# Patient Record
Sex: FEMALE | Race: WHITE | NOT HISPANIC OR LATINO | Employment: FULL TIME | URBAN - METROPOLITAN AREA
[De-identification: names, ages, dates, MRNs, and addresses within clinical notes are randomized per-mention and may not be internally consistent; named-entity substitution may affect disease eponyms.]

---

## 2023-03-31 DIAGNOSIS — R91.1 PULMONARY NODULE: ICD-10-CM

## 2023-04-11 ENCOUNTER — TELEPHONE (OUTPATIENT)
Dept: PRIMARY CARE | Facility: CLINIC | Age: 58
End: 2023-04-11
Payer: COMMERCIAL

## 2023-04-11 NOTE — TELEPHONE ENCOUNTER
She is in a clinical study now for her potassium and he noticed that it is not being absorbed properly and it does stated not to break or crush but she is on a half a pill a day so she said she will try taking one a day now    Or maybe the manufacture changed?    What are your thoughts on this should she try one a day now?    Please advise

## 2023-04-13 NOTE — TELEPHONE ENCOUNTER
Per dr newell she can take potassium eod or she can still cut it in half    Advised pt and she is aware and she is due for more labs next Friday     And we will see where her potassium is once she gets it back.

## 2023-05-04 DIAGNOSIS — E87.6 LOW BLOOD POTASSIUM: ICD-10-CM

## 2023-05-04 RX ORDER — POTASSIUM CHLORIDE 750 MG/1
10 TABLET, EXTENDED RELEASE ORAL 2 TIMES DAILY
COMMUNITY
Start: 2018-05-14 | End: 2023-11-03 | Stop reason: ALTCHOICE

## 2023-05-04 RX ORDER — POTASSIUM CHLORIDE 1500 MG/1
20 TABLET, EXTENDED RELEASE ORAL 2 TIMES DAILY
Qty: 180 TABLET | Refills: 1 | Status: SHIPPED | OUTPATIENT
Start: 2023-05-04 | End: 2023-05-04

## 2023-05-30 DIAGNOSIS — E78.2 HYPERLIPIDEMIA, MIXED: ICD-10-CM

## 2023-05-30 RX ORDER — ATORVASTATIN CALCIUM 40 MG/1
40 TABLET, FILM COATED ORAL NIGHTLY
Qty: 90 TABLET | Refills: 3 | Status: SHIPPED | OUTPATIENT
Start: 2023-05-30 | End: 2023-05-30

## 2023-05-30 RX ORDER — ATORVASTATIN CALCIUM 40 MG/1
1 TABLET, FILM COATED ORAL NIGHTLY
COMMUNITY
Start: 2022-04-29 | End: 2023-05-30 | Stop reason: SDUPTHER

## 2023-09-21 DIAGNOSIS — I10 BENIGN ESSENTIAL HYPERTENSION: ICD-10-CM

## 2023-09-21 DIAGNOSIS — E03.9 HYPOTHYROIDISM (ACQUIRED): ICD-10-CM

## 2023-09-21 RX ORDER — AMLODIPINE AND BENAZEPRIL HYDROCHLORIDE 10; 40 MG/1; MG/1
1 CAPSULE ORAL DAILY
COMMUNITY
Start: 2014-12-12 | End: 2023-09-21 | Stop reason: SDUPTHER

## 2023-09-21 RX ORDER — LEVOTHYROXINE SODIUM 25 UG/1
25 TABLET ORAL
COMMUNITY
Start: 2018-05-14 | End: 2023-09-21 | Stop reason: SDUPTHER

## 2023-09-21 RX ORDER — LEVOTHYROXINE SODIUM 25 UG/1
25 TABLET ORAL
Qty: 90 TABLET | Refills: 3 | Status: SHIPPED | OUTPATIENT
Start: 2023-09-21 | End: 2023-09-21

## 2023-09-21 RX ORDER — AMLODIPINE AND BENAZEPRIL HYDROCHLORIDE 10; 40 MG/1; MG/1
1 CAPSULE ORAL DAILY
Qty: 90 CAPSULE | Refills: 3 | Status: SHIPPED | OUTPATIENT
Start: 2023-09-21 | End: 2023-09-21

## 2023-10-26 DIAGNOSIS — K21.9 GASTROESOPHAGEAL REFLUX DISEASE WITHOUT ESOPHAGITIS: ICD-10-CM

## 2023-10-26 RX ORDER — ESOMEPRAZOLE MAGNESIUM 40 MG/1
40 CAPSULE, DELAYED RELEASE ORAL 2 TIMES DAILY
COMMUNITY
Start: 2014-12-12 | End: 2023-10-26 | Stop reason: SDUPTHER

## 2023-10-26 RX ORDER — ESOMEPRAZOLE MAGNESIUM 40 MG/1
40 CAPSULE, DELAYED RELEASE ORAL 2 TIMES DAILY
Qty: 180 CAPSULE | Refills: 3 | Status: SHIPPED | OUTPATIENT
Start: 2023-10-26 | End: 2024-05-02 | Stop reason: SDUPTHER

## 2023-10-27 ENCOUNTER — PHARMACY VISIT (OUTPATIENT)
Dept: PHARMACY | Facility: CLINIC | Age: 58
End: 2023-10-27
Payer: COMMERCIAL

## 2023-10-27 PROCEDURE — RXMED WILLOW AMBULATORY MEDICATION CHARGE

## 2023-11-03 ENCOUNTER — OFFICE VISIT (OUTPATIENT)
Dept: PRIMARY CARE | Facility: CLINIC | Age: 58
End: 2023-11-03
Payer: COMMERCIAL

## 2023-11-03 ENCOUNTER — PHARMACY VISIT (OUTPATIENT)
Dept: PHARMACY | Facility: CLINIC | Age: 58
End: 2023-11-03
Payer: COMMERCIAL

## 2023-11-03 ENCOUNTER — HOSPITAL ENCOUNTER (OUTPATIENT)
Dept: RADIOLOGY | Facility: EXTERNAL LOCATION | Age: 58
Discharge: HOME | End: 2023-11-03

## 2023-11-03 VITALS
SYSTOLIC BLOOD PRESSURE: 129 MMHG | BODY MASS INDEX: 23.56 KG/M2 | OXYGEN SATURATION: 98 % | HEART RATE: 90 BPM | DIASTOLIC BLOOD PRESSURE: 80 MMHG | WEIGHT: 138 LBS | TEMPERATURE: 97.7 F | HEIGHT: 64 IN

## 2023-11-03 DIAGNOSIS — E03.9 HYPOTHYROIDISM (ACQUIRED): ICD-10-CM

## 2023-11-03 DIAGNOSIS — Z12.31 ENCOUNTER FOR SCREENING MAMMOGRAM FOR MALIGNANT NEOPLASM OF BREAST: ICD-10-CM

## 2023-11-03 DIAGNOSIS — I10 BENIGN ESSENTIAL HYPERTENSION: ICD-10-CM

## 2023-11-03 DIAGNOSIS — Z00.01 ANNUAL VISIT FOR GENERAL ADULT MEDICAL EXAMINATION WITH ABNORMAL FINDINGS: Primary | ICD-10-CM

## 2023-11-03 DIAGNOSIS — E78.2 HYPERLIPIDEMIA, MIXED: ICD-10-CM

## 2023-11-03 DIAGNOSIS — K21.00 GASTROESOPHAGEAL REFLUX DISEASE WITH ESOPHAGITIS WITHOUT HEMORRHAGE: ICD-10-CM

## 2023-11-03 DIAGNOSIS — Z00.00 HEALTH CARE MAINTENANCE: ICD-10-CM

## 2023-11-03 PROCEDURE — 1036F TOBACCO NON-USER: CPT | Performed by: INTERNAL MEDICINE

## 2023-11-03 PROCEDURE — 99396 PREV VISIT EST AGE 40-64: CPT | Performed by: INTERNAL MEDICINE

## 2023-11-03 PROCEDURE — 3079F DIAST BP 80-89 MM HG: CPT | Performed by: INTERNAL MEDICINE

## 2023-11-03 PROCEDURE — 3074F SYST BP LT 130 MM HG: CPT | Performed by: INTERNAL MEDICINE

## 2023-11-03 PROCEDURE — RXMED WILLOW AMBULATORY MEDICATION CHARGE

## 2023-11-03 RX ORDER — AMLODIPINE AND BENAZEPRIL HYDROCHLORIDE 10; 40 MG/1; MG/1
1 CAPSULE ORAL DAILY
Qty: 90 CAPSULE | Refills: 3 | Status: SHIPPED | OUTPATIENT
Start: 2023-11-03 | End: 2024-11-02

## 2023-11-03 RX ORDER — SCOLOPAMINE TRANSDERMAL SYSTEM 1 MG/1
1 PATCH, EXTENDED RELEASE TRANSDERMAL
COMMUNITY
Start: 2019-12-05 | End: 2023-11-03 | Stop reason: SDUPTHER

## 2023-11-03 RX ORDER — SCOLOPAMINE TRANSDERMAL SYSTEM 1 MG/1
1 PATCH, EXTENDED RELEASE TRANSDERMAL
Qty: 10 PATCH | Refills: 0 | Status: SHIPPED | OUTPATIENT
Start: 2023-11-03 | End: 2024-05-02 | Stop reason: WASHOUT

## 2023-11-03 RX ORDER — ATORVASTATIN CALCIUM 40 MG/1
40 TABLET, FILM COATED ORAL NIGHTLY
Qty: 90 TABLET | Refills: 3 | Status: SHIPPED | OUTPATIENT
Start: 2023-11-03 | End: 2024-11-02

## 2023-11-03 ASSESSMENT — PATIENT HEALTH QUESTIONNAIRE - PHQ9
1. LITTLE INTEREST OR PLEASURE IN DOING THINGS: NOT AT ALL
2. FEELING DOWN, DEPRESSED OR HOPELESS: NOT AT ALL
SUM OF ALL RESPONSES TO PHQ9 QUESTIONS 1 AND 2: 0

## 2023-11-03 NOTE — PROGRESS NOTES
Subjective   Patient ID: Jordyn Catalan is a 58 y.o. female who presents for Annual Exam.    Assessment/Plan     Problem List Items Addressed This Visit       Hyperlipidemia, mixed    Relevant Medications    atorvastatin (Lipitor) 40 mg tablet    Other Relevant Orders    Albumin , Urine Random    CBC and Auto Differential    Comprehensive Metabolic Panel    Hemoglobin A1C    Lipid Panel    Magnesium    TSH with reflex to Free T4 if abnormal    Uric Acid    Microscopic Only, Urine    Benign essential hypertension     Patients BP readings reviewed and addressed, as we age our arteries turn stiffer and less elastic. Restricting salt consumption and staying physically fit with regular exercise regimen is the only way to keep our vasculature less tonic. Studies have shown that keeping ideal body wt, exercise routine about 140 to 150 minutes a week, eating variety of plant based diet and drinking plentiful water are quite helpful. Monitor BP twice or once a week at home and bring log to be reviewed by me. Uncontrolled BP has long term consequences including heart failure, myocardial infarction, accelerated atherosclerosis and kidney dysfunction. Therapy reviewed and explained.           Relevant Medications    amLODIPine-benazepriL (Lotrel) 10-40 mg capsule    Other Relevant Orders    Albumin , Urine Random    CBC and Auto Differential    Comprehensive Metabolic Panel    Hemoglobin A1C    Lipid Panel    Magnesium    TSH with reflex to Free T4 if abnormal    Uric Acid    Microscopic Only, Urine    Hypothyroidism (acquired)    GERD (gastroesophageal reflux disease)    Annual visit for general adult medical examination with abnormal findings - Primary    Relevant Medications    scopolamine (Transderm-Scop) 1 mg over 3 days patch 3 day    Other Relevant Orders    Albumin , Urine Random    CBC and Auto Differential    Comprehensive Metabolic Panel    Hemoglobin A1C    Lipid Panel    Magnesium    TSH with reflex to Free T4  if abnormal    Uric Acid    Microscopic Only, Urine       HPI  This is a 58-year-old patient  with children personal history of hypertension hyperlipidemia hypothyroidism gastritis    Negative for headache chest pain hematuria rectal bleeding    Recently participant clinical trial for the losing weight getting the once a week injection subcu    Negative for rectal bleeding hematuria vaginal bleeding    Negative for fall    Negative for depression    Negative for COVID      Past Medical History:   Diagnosis Date    Encounter for screening for malignant neoplasm of colon 2020    Screen for colon cancer    Encounter for screening for malignant neoplasm of rectum 2020    Screening for rectal cancer    Encounter for screening mammogram for malignant neoplasm of breast 2020    Other screening mammogram    Obesity, unspecified 2022    Obesity (BMI 30.0-34.9)    Personal history of other diseases of the respiratory system 2018    History of influenza    Personal history of other endocrine, nutritional and metabolic disease 2018    History of hypokalemia    Personal history of other endocrine, nutritional and metabolic disease 2020    History of vitamin D deficiency    Personal history of other specified conditions     History of fever    Personal history of urinary (tract) infections 2018    History of urinary tract infection     Past Surgical History:   Procedure Laterality Date    BREAST BIOPSY       SECTION, LOW TRANSVERSE      GALLBLADDER SURGERY       Allergies   Allergen Reactions    Apricot Flavor Hives     Current Outpatient Medications   Medication Sig Dispense Refill    esomeprazole (NexIUM) 40 mg DR capsule Take 1 capsule (40 mg) by mouth 2 times a day. 180 capsule 3    levothyroxine (Synthroid, Levoxyl) 25 mcg tablet TAKE 1 TABLET BY MOUTH EVERY MORNING BEFORE MEALS 90 tablet 3    potassium chloride CR (Klor-Con M20) 20 mEq ER tablet TAKE 1 TABLET BY  MOUTH TWO TIMES A DAY (DO NOT CRUSH,CHEW OR SPLIT) 180 tablet 1    amLODIPine-benazepriL (Lotrel) 10-40 mg capsule TAKE 1 CAPSULE BY MOUTH ONCE DAILY 90 capsule 3    atorvastatin (Lipitor) 40 mg tablet TAKE 1 TABLET BY MOUTH AT BEDTIME 90 tablet 3    scopolamine (Transderm-Scop) 1 mg over 3 days patch 3 day Place 1 patch on the skin every 3rd day. 10 patch 0     No current facility-administered medications for this visit.     Family History   Problem Relation Name Age of Onset    Dementia Mother      Lymphoma Father       Social History     Socioeconomic History    Marital status:      Spouse name: None    Number of children: None    Years of education: None    Highest education level: None   Occupational History    None   Tobacco Use    Smoking status: Never    Smokeless tobacco: Never   Substance and Sexual Activity    Alcohol use: Never    Drug use: Never    Sexual activity: None   Other Topics Concern    None   Social History Narrative    None     Social Determinants of Health     Financial Resource Strain: Not on file   Food Insecurity: Not on file   Transportation Needs: Not on file   Physical Activity: Not on file   Stress: Not on file   Social Connections: Not on file   Intimate Partner Violence: Not on file   Housing Stability: Not on file     Immunization History   Administered Date(s) Administered    Flu vaccine (IIV4), preservative free *Check age/dose* 10/15/2023    Influenza, seasonal, injectable 09/04/2020    Moderna COVID-19 vaccine, Fall 2023, 12 yeasrs and older (50mcg/0.5mL) 10/15/2023    Moderna SARS-CoV-2 Vaccination 01/18/2021, 02/15/2021, 11/11/2021, 05/17/2022    Pneumococcal Conjugate PCV 7 1963    Pneumococcal polysaccharide vaccine, 23-valent, age 2 years and older (PNEUMOVAX 23) 07/12/2016    Tdap vaccine, age 7 year and older (BOOSTRIX) 07/12/2016, 05/30/2019    Zoster vaccine, recombinant, adult (SHINGRIX) 07/19/2019, 06/19/2020, 09/04/2020       Review of Systems  Review  "of systems is otherwise negative unless stated above or in history of present illness.    Objective   Visit Vitals  /80 (BP Location: Left arm, Patient Position: Sitting, BP Cuff Size: Adult)   Pulse 90   Temp 36.5 °C (97.7 °F)   Ht 1.626 m (5' 4\")   Wt 62.6 kg (138 lb)   SpO2 98%   BMI 23.69 kg/m²   Smoking Status Never   BSA 1.68 m²     Physical Exam  Constitutional:       General: not in acute distress.   HENT:      Head: Normocephalic and atraumatic.      Nose: Nose normal.   Eyes:      Extraocular Movements: Extraocular movements intact.      Conjunctiva/sclera: Conjunctivae normal.   Cardiovascular:      Rate and Rhythm: Normal rate ,  No M/R/G  Pulmonary:      Effort: Pulmonary effort is normal.      Breath sounds: Normal, Bilat Equal AE  Skin:     General: Skin is warm.   Neurological:      Mental Status: He is alert and oriented to person, place, and time.   Psychiatric:         Mood and Affect: Mood normal.         Behavior: Behavior normal.   Musculoskeletal   FROM in all extremitirs,  Joint-no swelling or tenderness  Hyperlipidemia increase Lipitor to 80 mg a day  Hemoglobin A1c advised metformin 500 mg a day electrolyte imbalance check potassium magnesium calcium mild UTI check UA CNS  No visits with results within 4 Month(s) from this visit.   Latest known visit with results is:   Legacy Encounter on 08/20/2022   Component Date Value Ref Range Status    Cholesterol 08/20/2022 209 (H)  0 - 199 mg/dL Final    HDL 08/20/2022 61.3  mg/dL Final    Cholesterol/HDL Ratio 08/20/2022 3.4   Final    LDL 08/20/2022 126 (H)  0 - 99 mg/dL Final    VLDL 08/20/2022 22  0 - 40 mg/dL Final    Triglycerides 08/20/2022 109  0 - 149 mg/dL Final    TSH 08/20/2022 3.59  0.44 - 3.98 mIU/L Final    WBC 08/20/2022 6.3  4.4 - 11.3 x10E9/L Final    nRBC 08/20/2022 0.0  0.0 - 0.0 /100 WBC Final    RBC 08/20/2022 4.72  4.00 - 5.20 x10E12/L Final    Hemoglobin 08/20/2022 14.0  12.0 - 16.0 g/dL Final    Hematocrit 08/20/2022 " 42.3  36.0 - 46.0 % Final    MCV 08/20/2022 90  80 - 100 fL Final    MCHC 08/20/2022 33.1  32.0 - 36.0 g/dL Final    Platelets 08/20/2022 343  150 - 450 x10E9/L Final    RDW 08/20/2022 12.5  11.5 - 14.5 % Final    Neutrophils % 08/20/2022 59.2  40.0 - 80.0 % Final    Immature Granulocytes %, Automated 08/20/2022 0.3  0.0 - 0.9 % Final    Lymphocytes % 08/20/2022 28.0  13.0 - 44.0 % Final    Monocytes % 08/20/2022 8.2  2.0 - 10.0 % Final    Eosinophils % 08/20/2022 3.5  0.0 - 6.0 % Final    Basophils % 08/20/2022 0.8  0.0 - 2.0 % Final    Neutrophils Absolute 08/20/2022 3.70  1.20 - 7.70 x10E9/L Final    Lymphocytes Absolute 08/20/2022 1.75  1.20 - 4.80 x10E9/L Final    Monocytes Absolute 08/20/2022 0.51  0.10 - 1.00 x10E9/L Final    Eosinophils Absolute 08/20/2022 0.22  0.00 - 0.70 x10E9/L Final    Basophils Absolute 08/20/2022 0.05  0.00 - 0.10 x10E9/L Final    WBC, Urine 08/20/2022 6 (A)  0 - 5 /HPF Final    RBC, Urine 08/20/2022 2  0 - 5 /HPF Final    Squamous Epithelial Cells, Urine 08/20/2022 <1  /HPF Final    Hemoglobin A1C 08/20/2022 5.7 (A)  % Final    Estimated Average Glucose 08/20/2022 117  MG/DL Final    Glucose 08/20/2022 105 (H)  74 - 99 mg/dL Final    Sodium 08/20/2022 142  136 - 145 mmol/L Final    Potassium 08/20/2022 3.4 (L)  3.5 - 5.3 mmol/L Final    Chloride 08/20/2022 104  98 - 107 mmol/L Final    Bicarbonate 08/20/2022 30  21 - 32 mmol/L Final    Anion Gap 08/20/2022 11  10 - 20 mmol/L Final    Urea Nitrogen 08/20/2022 17  6 - 23 mg/dL Final    Creatinine 08/20/2022 0.77  0.50 - 1.05 mg/dL Final    GFR Female 08/20/2022 89  >90 mL/min/1.73m2 Final    Calcium 08/20/2022 9.5  8.6 - 10.6 mg/dL Final    Albumin 08/20/2022 4.1  3.4 - 5.0 g/dL Final    Alkaline Phosphatase 08/20/2022 62  33 - 110 U/L Final    Total Protein 08/20/2022 6.4  6.4 - 8.2 g/dL Final    AST 08/20/2022 14  9 - 39 U/L Final    Total Bilirubin 08/20/2022 0.5  0.0 - 1.2 mg/dL Final    ALT (SGPT) 08/20/2022 16  7 - 45 U/L Final     Color, Urine 08/20/2022 YELLOW  STRAW,YELLOW Final    Appearance, Urine 08/20/2022 CLEAR  CLEAR Final    Specific Gravity, Urine 08/20/2022 1.017  1.005 - 1.035 Final    pH, Urine 08/20/2022 5.0  5.0 - 8.0 Final    Protein, Urine 08/20/2022 NEGATIVE  NEGATIVE mg/dL Final    Glucose, Urine 08/20/2022 NEGATIVE  NEGATIVE mg/dL Final    Blood, Urine 08/20/2022 NEGATIVE  NEGATIVE Final    Ketones, Urine 08/20/2022 NEGATIVE  NEGATIVE mg/dL Final    Bilirubin, Urine 08/20/2022 NEGATIVE  NEGATIVE Final    Urobilinogen, Urine 08/20/2022 <2.0  0.0 - 1.9 mg/dL Final    Nitrite, Urine 08/20/2022 NEGATIVE  NEGATIVE Final    Leukocyte Esterase, Urine 08/20/2022 TRACE (A)  NEGATIVE Final       Radiology: Reviewed imaging in powerchart.  BI mammo bilateral screening tomosynthesis    Result Date: 11/3/2023  These images are not reportable by radiology and will not be interpreted by  Radiologists.        Charting was completed using voice recognition technology and may include unintended errors.

## 2023-12-14 PROCEDURE — RXMED WILLOW AMBULATORY MEDICATION CHARGE

## 2023-12-19 ENCOUNTER — PHARMACY VISIT (OUTPATIENT)
Dept: PHARMACY | Facility: CLINIC | Age: 58
End: 2023-12-19
Payer: COMMERCIAL

## 2024-01-08 PROCEDURE — RXMED WILLOW AMBULATORY MEDICATION CHARGE

## 2024-01-10 ENCOUNTER — PHARMACY VISIT (OUTPATIENT)
Dept: PHARMACY | Facility: CLINIC | Age: 59
End: 2024-01-10
Payer: COMMERCIAL

## 2024-01-10 PROCEDURE — RXMED WILLOW AMBULATORY MEDICATION CHARGE

## 2024-01-11 ENCOUNTER — PHARMACY VISIT (OUTPATIENT)
Dept: PHARMACY | Facility: CLINIC | Age: 59
End: 2024-01-11
Payer: COMMERCIAL

## 2024-02-09 ENCOUNTER — TELEPHONE (OUTPATIENT)
Dept: PRIMARY CARE | Facility: CLINIC | Age: 59
End: 2024-02-09
Payer: COMMERCIAL

## 2024-02-09 DIAGNOSIS — E87.6 LOW BLOOD POTASSIUM: ICD-10-CM

## 2024-02-09 PROCEDURE — RXMED WILLOW AMBULATORY MEDICATION CHARGE

## 2024-02-09 RX ORDER — POTASSIUM CHLORIDE 20 MEQ/1
TABLET, EXTENDED RELEASE ORAL
Qty: 180 TABLET | Refills: 1 | Status: SHIPPED | OUTPATIENT
Start: 2024-02-09 | End: 2024-05-02 | Stop reason: WASHOUT

## 2024-02-09 NOTE — TELEPHONE ENCOUNTER
----- Message from Jordyn Catalan sent at 2/9/2024 10:02 AM EST -----  Regarding: Refill  Contact: [phone]  Hi. I need my potassium refilled but I need to make sure I get the right one!  The label says [facility] if that helps. Potassium cl 20 mEq SR tab. Thanks much. This needs to go to [facility] and they send it to me.

## 2024-02-13 ENCOUNTER — PHARMACY VISIT (OUTPATIENT)
Dept: PHARMACY | Facility: CLINIC | Age: 59
End: 2024-02-13
Payer: COMMERCIAL

## 2024-02-22 DIAGNOSIS — N39.0 URINARY TRACT INFECTION WITHOUT HEMATURIA, SITE UNSPECIFIED: ICD-10-CM

## 2024-02-22 RX ORDER — CIPROFLOXACIN 250 MG/1
250 TABLET, FILM COATED ORAL 2 TIMES DAILY
Qty: 14 TABLET | Refills: 0 | Status: SHIPPED | OUTPATIENT
Start: 2024-02-22 | End: 2024-03-04

## 2024-02-23 PROCEDURE — RXMED WILLOW AMBULATORY MEDICATION CHARGE

## 2024-02-26 ENCOUNTER — PHARMACY VISIT (OUTPATIENT)
Dept: PHARMACY | Facility: CLINIC | Age: 59
End: 2024-02-26
Payer: COMMERCIAL

## 2024-03-12 PROCEDURE — RXMED WILLOW AMBULATORY MEDICATION CHARGE

## 2024-03-16 ENCOUNTER — PHARMACY VISIT (OUTPATIENT)
Dept: PHARMACY | Facility: CLINIC | Age: 59
End: 2024-03-16
Payer: COMMERCIAL

## 2024-03-22 PROCEDURE — RXMED WILLOW AMBULATORY MEDICATION CHARGE

## 2024-03-25 ENCOUNTER — PHARMACY VISIT (OUTPATIENT)
Dept: PHARMACY | Facility: CLINIC | Age: 59
End: 2024-03-25
Payer: COMMERCIAL

## 2024-04-16 PROCEDURE — RXMED WILLOW AMBULATORY MEDICATION CHARGE

## 2024-04-25 ENCOUNTER — PHARMACY VISIT (OUTPATIENT)
Dept: PHARMACY | Facility: CLINIC | Age: 59
End: 2024-04-25
Payer: COMMERCIAL

## 2024-04-25 DIAGNOSIS — K21.9 GASTROESOPHAGEAL REFLUX DISEASE WITHOUT ESOPHAGITIS: ICD-10-CM

## 2024-04-25 PROCEDURE — RXMED WILLOW AMBULATORY MEDICATION CHARGE

## 2024-04-25 RX ORDER — PANTOPRAZOLE SODIUM 40 MG/1
40 TABLET, DELAYED RELEASE ORAL DAILY
Qty: 30 TABLET | Refills: 1 | Status: SHIPPED | OUTPATIENT
Start: 2024-04-25 | End: 2024-05-02 | Stop reason: ALTCHOICE

## 2024-04-26 ENCOUNTER — PHARMACY VISIT (OUTPATIENT)
Dept: PHARMACY | Facility: CLINIC | Age: 59
End: 2024-04-26
Payer: COMMERCIAL

## 2024-05-02 ENCOUNTER — TELEMEDICINE (OUTPATIENT)
Dept: PRIMARY CARE | Facility: CLINIC | Age: 59
End: 2024-05-02
Payer: COMMERCIAL

## 2024-05-02 VITALS — HEIGHT: 64 IN | WEIGHT: 130 LBS | BODY MASS INDEX: 22.2 KG/M2

## 2024-05-02 DIAGNOSIS — E03.9 HYPOTHYROIDISM (ACQUIRED): ICD-10-CM

## 2024-05-02 DIAGNOSIS — Z11.4 SCREENING FOR HIV WITHOUT PRESENCE OF RISK FACTORS: ICD-10-CM

## 2024-05-02 DIAGNOSIS — R10.10 PAIN OF UPPER ABDOMEN: ICD-10-CM

## 2024-05-02 DIAGNOSIS — K21.9 GASTROESOPHAGEAL REFLUX DISEASE WITHOUT ESOPHAGITIS: ICD-10-CM

## 2024-05-02 DIAGNOSIS — I10 BENIGN ESSENTIAL HYPERTENSION: ICD-10-CM

## 2024-05-02 DIAGNOSIS — Z53.20 SCREENING FOR HEPATITIS C DECLINED: ICD-10-CM

## 2024-05-02 DIAGNOSIS — K21.00 GASTROESOPHAGEAL REFLUX DISEASE WITH ESOPHAGITIS WITHOUT HEMORRHAGE: Primary | ICD-10-CM

## 2024-05-02 DIAGNOSIS — E78.2 HYPERLIPIDEMIA, MIXED: ICD-10-CM

## 2024-05-02 PROCEDURE — 99214 OFFICE O/P EST MOD 30 MIN: CPT | Performed by: INTERNAL MEDICINE

## 2024-05-02 PROCEDURE — 1036F TOBACCO NON-USER: CPT | Performed by: INTERNAL MEDICINE

## 2024-05-02 RX ORDER — ESOMEPRAZOLE MAGNESIUM 40 MG/1
40 CAPSULE, DELAYED RELEASE ORAL 2 TIMES DAILY
Qty: 180 CAPSULE | Refills: 3 | Status: SHIPPED | OUTPATIENT
Start: 2024-05-02

## 2024-05-02 NOTE — PROGRESS NOTES
Subjective   Patient ID: Jordyn Catalan is a 58 y.o. female who presents for Follow-up (Discuss Nexium ).    Assessment/Plan     Problem List Items Addressed This Visit       Hyperlipidemia, mixed    Relevant Orders    HIV 1/2 Antigen/Antibody Screen with Reflex to Confirmation    Hepatitis C antibody    CBC and Auto Differential    Comprehensive Metabolic Panel    Lipid Panel    TSH with reflex to Free T4 if abnormal    Microscopic Only, Urine    Lipase    Amylase    H. pylori antigen, stool    Sedimentation rate, automated    CT abdomen pelvis w and wo IV contrast    Benign essential hypertension     Patients BP readings reviewed and addressed, as we age our arteries turn stiffer and less elastic. Restricting salt consumption and staying physically fit with regular exercise regimen is the only way to keep our vasculature less tonic. Studies have shown that keeping ideal body wt, exercise routine about 140 to 150 minutes a week, eating variety of plant based diet and drinking plentiful water are quite helpful. Monitor BP twice or once a week at home and bring log to be reviewed by me. Uncontrolled BP has long term consequences including heart failure, myocardial infarction, accelerated atherosclerosis and kidney dysfunction. Therapy reviewed and explained.           Relevant Orders    HIV 1/2 Antigen/Antibody Screen with Reflex to Confirmation    Hepatitis C antibody    CBC and Auto Differential    Comprehensive Metabolic Panel    Lipid Panel    TSH with reflex to Free T4 if abnormal    Microscopic Only, Urine    Lipase    Amylase    H. pylori antigen, stool    Sedimentation rate, automated    CT abdomen pelvis w and wo IV contrast    Hypothyroidism (acquired)    GERD (gastroesophageal reflux disease) - Primary     Seen by gastroenterology underwent for EGD colonoscopy 2022 on weight loss program had a 30 pound weight loss with moderate to severe pain started with the Nexium and Protonix switch over because of  the insurance discussed with the patient advised gastroenterology evaluation with EGD CAT scan abdomen pelvis check CBC CMP amylase lipase sed rate H. pylori continue Nexium 40 twice a day if no better come to office go to ER         Screening for HIV without presence of risk factors    Relevant Orders    HIV 1/2 Antigen/Antibody Screen with Reflex to Confirmation    Hepatitis C antibody    CBC and Auto Differential    Comprehensive Metabolic Panel    Lipid Panel    TSH with reflex to Free T4 if abnormal    Microscopic Only, Urine    Lipase    Amylase    H. pylori antigen, stool    Sedimentation rate, automated    CT abdomen pelvis w and wo IV contrast    Screening for hepatitis C declined    Relevant Orders    HIV 1/2 Antigen/Antibody Screen with Reflex to Confirmation    Hepatitis C antibody    CBC and Auto Differential    Comprehensive Metabolic Panel    Lipid Panel    TSH with reflex to Free T4 if abnormal    Microscopic Only, Urine    Lipase    Amylase    H. pylori antigen, stool    Sedimentation rate, automated    CT abdomen pelvis w and wo IV contrast    Pain of upper abdomen    Relevant Orders    HIV 1/2 Antigen/Antibody Screen with Reflex to Confirmation    Hepatitis C antibody    CBC and Auto Differential    Comprehensive Metabolic Panel    Lipid Panel    TSH with reflex to Free T4 if abnormal    Microscopic Only, Urine    Lipase    Amylase    H. pylori antigen, stool    Sedimentation rate, automated    CT abdomen pelvis w and wo IV contrast    EGD       HPI 58-year-old patient  with 3 children grandchildren    Personal history of hypertension hyperlipidemia colon polyps chronic gastritis hiatal hernia    Mother have gastritis    Father have a hiatal hernia gastritis    Negative for: Esophageal cancer    Recently involved in the clinical trial for the weight loss taking the injection once a week    Successfully lost 30 pound weight    Recently insurance does not cover Nexium changed to the Protonix  had a moderate to severe pain in the epigastric and abdomen especially at night    Negative for jaundice hematuria rectal bleeding steatorrhea    Negative for pruritus or skin      Past Medical History:   Diagnosis Date    Encounter for screening for malignant neoplasm of colon 2020    Screen for colon cancer    Encounter for screening for malignant neoplasm of rectum 2020    Screening for rectal cancer    Encounter for screening mammogram for malignant neoplasm of breast 2020    Other screening mammogram    Obesity, unspecified 2022    Obesity (BMI 30.0-34.9)    Personal history of other diseases of the respiratory system 2018    History of influenza    Personal history of other endocrine, nutritional and metabolic disease 2018    History of hypokalemia    Personal history of other endocrine, nutritional and metabolic disease 2020    History of vitamin D deficiency    Personal history of other specified conditions     History of fever    Personal history of urinary (tract) infections 2018    History of urinary tract infection     Past Surgical History:   Procedure Laterality Date    BREAST BIOPSY       SECTION, LOW TRANSVERSE      GALLBLADDER SURGERY       Allergies   Allergen Reactions    Apricot Flavor Hives     Current Outpatient Medications   Medication Sig Dispense Refill    amLODIPine-benazepriL (Lotrel) 10-40 mg capsule TAKE 1 CAPSULE BY MOUTH ONCE DAILY 90 capsule 3    atorvastatin (Lipitor) 40 mg tablet TAKE 1 TABLET BY MOUTH AT BEDTIME 90 tablet 3    esomeprazole (NexIUM) 40 mg DR capsule Take 1 capsule (40 mg) by mouth 2 times a day. 180 capsule 3    levothyroxine (Synthroid, Levoxyl) 25 mcg tablet TAKE 1 TABLET BY MOUTH EVERY MORNING BEFORE MEALS 90 tablet 3     No current facility-administered medications for this visit.     Family History   Problem Relation Name Age of Onset    Dementia Mother      Lymphoma Father       Social History  "    Socioeconomic History    Marital status:      Spouse name: None    Number of children: None    Years of education: None    Highest education level: None   Occupational History    None   Tobacco Use    Smoking status: Never    Smokeless tobacco: Never   Substance and Sexual Activity    Alcohol use: Never    Drug use: Never    Sexual activity: None   Other Topics Concern    None   Social History Narrative    None     Social Determinants of Health     Financial Resource Strain: Not on file   Food Insecurity: Not on file   Transportation Needs: Not on file   Physical Activity: Not on file   Stress: Not on file   Social Connections: Not on file   Intimate Partner Violence: Not on file   Housing Stability: Not on file     Immunization History   Administered Date(s) Administered    Flu vaccine (IIV4), preservative free *Check age/dose* 10/15/2023    Influenza, seasonal, injectable 09/04/2020    Moderna COVID-19 vaccine, Fall 2023, 12 yeasrs and older (50mcg/0.5mL) 10/15/2023    Moderna SARS-CoV-2 Vaccination 01/18/2021, 02/15/2021, 11/11/2021, 05/17/2022    Pneumococcal Conjugate PCV 7 1963    Pneumococcal polysaccharide vaccine, 23-valent, age 2 years and older (PNEUMOVAX 23) 07/12/2016    Tdap vaccine, age 7 year and older (BOOSTRIX, ADACEL) 07/12/2016, 05/30/2019    Zoster vaccine, recombinant, adult (SHINGRIX) 07/19/2019, 06/19/2020, 09/04/2020       Review of Systems  Review of systems is otherwise negative unless stated above or in history of present illness.    Objective   Visit Vitals  Ht 1.626 m (5' 4\")   Wt 59 kg (130 lb)   BMI 22.31 kg/m²   Smoking Status Never   BSA 1.63 m²     Physical Exam  Patient looks sick but no jaundice    No visits with results within 4 Month(s) from this visit.   Latest known visit with results is:   Legacy Encounter on 08/20/2022   Component Date Value Ref Range Status    Cholesterol 08/20/2022 209 (H)  0 - 199 mg/dL Final    HDL 08/20/2022 61.3  mg/dL Final    " Cholesterol/HDL Ratio 08/20/2022 3.4   Final    LDL 08/20/2022 126 (H)  0 - 99 mg/dL Final    VLDL 08/20/2022 22  0 - 40 mg/dL Final    Triglycerides 08/20/2022 109  0 - 149 mg/dL Final    TSH 08/20/2022 3.59  0.44 - 3.98 mIU/L Final    WBC 08/20/2022 6.3  4.4 - 11.3 x10E9/L Final    nRBC 08/20/2022 0.0  0.0 - 0.0 /100 WBC Final    RBC 08/20/2022 4.72  4.00 - 5.20 x10E12/L Final    Hemoglobin 08/20/2022 14.0  12.0 - 16.0 g/dL Final    Hematocrit 08/20/2022 42.3  36.0 - 46.0 % Final    MCV 08/20/2022 90  80 - 100 fL Final    MCHC 08/20/2022 33.1  32.0 - 36.0 g/dL Final    Platelets 08/20/2022 343  150 - 450 x10E9/L Final    RDW 08/20/2022 12.5  11.5 - 14.5 % Final    Neutrophils % 08/20/2022 59.2  40.0 - 80.0 % Final    Immature Granulocytes %, Automated 08/20/2022 0.3  0.0 - 0.9 % Final    Lymphocytes % 08/20/2022 28.0  13.0 - 44.0 % Final    Monocytes % 08/20/2022 8.2  2.0 - 10.0 % Final    Eosinophils % 08/20/2022 3.5  0.0 - 6.0 % Final    Basophils % 08/20/2022 0.8  0.0 - 2.0 % Final    Neutrophils Absolute 08/20/2022 3.70  1.20 - 7.70 x10E9/L Final    Lymphocytes Absolute 08/20/2022 1.75  1.20 - 4.80 x10E9/L Final    Monocytes Absolute 08/20/2022 0.51  0.10 - 1.00 x10E9/L Final    Eosinophils Absolute 08/20/2022 0.22  0.00 - 0.70 x10E9/L Final    Basophils Absolute 08/20/2022 0.05  0.00 - 0.10 x10E9/L Final    WBC, Urine 08/20/2022 6 (A)  0 - 5 /HPF Final    RBC, Urine 08/20/2022 2  0 - 5 /HPF Final    Squamous Epithelial Cells, Urine 08/20/2022 <1  /HPF Final    Hemoglobin A1C 08/20/2022 5.7 (A)  % Final    Estimated Average Glucose 08/20/2022 117  MG/DL Final    Glucose 08/20/2022 105 (H)  74 - 99 mg/dL Final    Sodium 08/20/2022 142  136 - 145 mmol/L Final    Potassium 08/20/2022 3.4 (L)  3.5 - 5.3 mmol/L Final    Chloride 08/20/2022 104  98 - 107 mmol/L Final    Bicarbonate 08/20/2022 30  21 - 32 mmol/L Final    Anion Gap 08/20/2022 11  10 - 20 mmol/L Final    Urea Nitrogen 08/20/2022 17  6 - 23 mg/dL Final     Creatinine 08/20/2022 0.77  0.50 - 1.05 mg/dL Final    GFR Female 08/20/2022 89  >90 mL/min/1.73m2 Final    Calcium 08/20/2022 9.5  8.6 - 10.6 mg/dL Final    Albumin 08/20/2022 4.1  3.4 - 5.0 g/dL Final    Alkaline Phosphatase 08/20/2022 62  33 - 110 U/L Final    Total Protein 08/20/2022 6.4  6.4 - 8.2 g/dL Final    AST 08/20/2022 14  9 - 39 U/L Final    Total Bilirubin 08/20/2022 0.5  0.0 - 1.2 mg/dL Final    ALT (SGPT) 08/20/2022 16  7 - 45 U/L Final    Color, Urine 08/20/2022 YELLOW  STRAW,YELLOW Final    Appearance, Urine 08/20/2022 CLEAR  CLEAR Final    Specific Gravity, Urine 08/20/2022 1.017  1.005 - 1.035 Final    pH, Urine 08/20/2022 5.0  5.0 - 8.0 Final    Protein, Urine 08/20/2022 NEGATIVE  NEGATIVE mg/dL Final    Glucose, Urine 08/20/2022 NEGATIVE  NEGATIVE mg/dL Final    Blood, Urine 08/20/2022 NEGATIVE  NEGATIVE Final    Ketones, Urine 08/20/2022 NEGATIVE  NEGATIVE mg/dL Final    Bilirubin, Urine 08/20/2022 NEGATIVE  NEGATIVE Final    Urobilinogen, Urine 08/20/2022 <2.0  0.0 - 1.9 mg/dL Final    Nitrite, Urine 08/20/2022 NEGATIVE  NEGATIVE Final    Leukocyte Esterase, Urine 08/20/2022 TRACE (A)  NEGATIVE Final       Radiology: Reviewed imaging in powerchart.  No results found.      Charting was completed using voice recognition technology and may include unintended errors.

## 2024-05-02 NOTE — ASSESSMENT & PLAN NOTE
Seen by gastroenterology underwent for EGD colonoscopy 2022 on weight loss program had a 30 pound weight loss with moderate to severe pain started with the Nexium and Protonix switch over because of the insurance discussed with the patient advised gastroenterology evaluation with EGD CAT scan abdomen pelvis check CBC CMP amylase lipase sed rate H. pylori continue Nexium 40 twice a day if no better come to office go to ER

## 2024-05-07 PROCEDURE — RXMED WILLOW AMBULATORY MEDICATION CHARGE

## 2024-05-10 ENCOUNTER — PHARMACY VISIT (OUTPATIENT)
Dept: PHARMACY | Facility: CLINIC | Age: 59
End: 2024-05-10
Payer: COMMERCIAL

## 2024-05-17 ENCOUNTER — APPOINTMENT (OUTPATIENT)
Dept: PRIMARY CARE | Facility: CLINIC | Age: 59
End: 2024-05-17
Payer: COMMERCIAL

## 2024-05-20 ENCOUNTER — APPOINTMENT (OUTPATIENT)
Dept: RADIOLOGY | Facility: CLINIC | Age: 59
End: 2024-05-20
Payer: COMMERCIAL

## 2024-05-28 DIAGNOSIS — E87.6 LOW BLOOD POTASSIUM: ICD-10-CM

## 2024-05-28 PROCEDURE — RXMED WILLOW AMBULATORY MEDICATION CHARGE

## 2024-05-28 RX ORDER — POTASSIUM CHLORIDE 20 MEQ/1
20 TABLET, EXTENDED RELEASE ORAL DAILY
Qty: 90 TABLET | Refills: 3 | Status: SHIPPED | OUTPATIENT
Start: 2024-05-28 | End: 2025-05-28

## 2024-06-04 ENCOUNTER — PHARMACY VISIT (OUTPATIENT)
Dept: PHARMACY | Facility: CLINIC | Age: 59
End: 2024-06-04
Payer: COMMERCIAL

## 2024-06-10 PROCEDURE — RXMED WILLOW AMBULATORY MEDICATION CHARGE

## 2024-06-12 ENCOUNTER — PHARMACY VISIT (OUTPATIENT)
Dept: PHARMACY | Facility: CLINIC | Age: 59
End: 2024-06-12
Payer: COMMERCIAL

## 2024-07-12 PROCEDURE — RXMED WILLOW AMBULATORY MEDICATION CHARGE

## 2024-07-15 ENCOUNTER — PHARMACY VISIT (OUTPATIENT)
Dept: PHARMACY | Facility: CLINIC | Age: 59
End: 2024-07-15
Payer: COMMERCIAL

## 2024-08-01 PROCEDURE — RXMED WILLOW AMBULATORY MEDICATION CHARGE

## 2024-08-03 ENCOUNTER — PHARMACY VISIT (OUTPATIENT)
Dept: PHARMACY | Facility: CLINIC | Age: 59
End: 2024-08-03
Payer: COMMERCIAL

## 2024-08-26 DIAGNOSIS — E03.9 HYPOTHYROIDISM (ACQUIRED): ICD-10-CM

## 2024-08-26 PROCEDURE — RXMED WILLOW AMBULATORY MEDICATION CHARGE

## 2024-08-26 RX ORDER — LEVOTHYROXINE SODIUM 25 UG/1
25 TABLET ORAL
Qty: 90 TABLET | Refills: 3 | Status: SHIPPED | OUTPATIENT
Start: 2024-08-26 | End: 2025-08-26

## 2024-08-27 ENCOUNTER — PHARMACY VISIT (OUTPATIENT)
Dept: PHARMACY | Facility: CLINIC | Age: 59
End: 2024-08-27
Payer: COMMERCIAL

## 2024-09-18 PROCEDURE — RXMED WILLOW AMBULATORY MEDICATION CHARGE

## 2024-09-20 ENCOUNTER — PHARMACY VISIT (OUTPATIENT)
Dept: PHARMACY | Facility: CLINIC | Age: 59
End: 2024-09-20
Payer: COMMERCIAL

## 2024-09-30 DIAGNOSIS — Z12.31 ENCOUNTER FOR SCREENING MAMMOGRAM FOR MALIGNANT NEOPLASM OF BREAST: ICD-10-CM

## 2024-10-24 PROCEDURE — RXMED WILLOW AMBULATORY MEDICATION CHARGE

## 2024-10-26 ENCOUNTER — PHARMACY VISIT (OUTPATIENT)
Dept: PHARMACY | Facility: CLINIC | Age: 59
End: 2024-10-26
Payer: COMMERCIAL

## 2024-11-12 ENCOUNTER — TELEPHONE (OUTPATIENT)
Dept: PRIMARY CARE | Facility: CLINIC | Age: 59
End: 2024-11-12
Payer: COMMERCIAL

## 2024-11-12 DIAGNOSIS — E78.2 HYPERLIPIDEMIA, MIXED: ICD-10-CM

## 2024-11-12 PROCEDURE — RXMED WILLOW AMBULATORY MEDICATION CHARGE

## 2024-11-12 RX ORDER — ATORVASTATIN CALCIUM 40 MG/1
40 TABLET, FILM COATED ORAL NIGHTLY
Qty: 90 TABLET | Refills: 3 | Status: SHIPPED | OUTPATIENT
Start: 2024-11-12 | End: 2025-11-12

## 2024-11-12 NOTE — TELEPHONE ENCOUNTER
PT had a physical scheduled for 11/15 and we moved it to 1/27/25 per PT's request needing a morning appointment.      PT needs a refill on her atorvastatin (Lipitor) 40 mg tablet

## 2024-11-15 ENCOUNTER — APPOINTMENT (OUTPATIENT)
Dept: PRIMARY CARE | Facility: CLINIC | Age: 59
End: 2024-11-15
Payer: COMMERCIAL

## 2024-11-16 ENCOUNTER — PHARMACY VISIT (OUTPATIENT)
Dept: PHARMACY | Facility: CLINIC | Age: 59
End: 2024-11-16
Payer: COMMERCIAL

## 2024-12-04 DIAGNOSIS — I10 BENIGN ESSENTIAL HYPERTENSION: ICD-10-CM

## 2024-12-05 PROCEDURE — RXMED WILLOW AMBULATORY MEDICATION CHARGE

## 2024-12-05 RX ORDER — AMLODIPINE AND BENAZEPRIL HYDROCHLORIDE 10; 40 MG/1; MG/1
1 CAPSULE ORAL DAILY
Qty: 90 CAPSULE | Refills: 3 | Status: SHIPPED | OUTPATIENT
Start: 2024-12-05 | End: 2025-12-05

## 2024-12-09 ENCOUNTER — PHARMACY VISIT (OUTPATIENT)
Dept: PHARMACY | Facility: CLINIC | Age: 59
End: 2024-12-09
Payer: COMMERCIAL

## 2024-12-20 DIAGNOSIS — I10 BENIGN ESSENTIAL HYPERTENSION: ICD-10-CM

## 2024-12-20 PROCEDURE — RXMED WILLOW AMBULATORY MEDICATION CHARGE

## 2024-12-20 RX ORDER — TRIAMTERENE/HYDROCHLOROTHIAZID 37.5-25 MG
1 TABLET ORAL DAILY
Qty: 90 TABLET | Refills: 3 | Status: SHIPPED | OUTPATIENT
Start: 2024-12-20

## 2025-01-03 ENCOUNTER — PHARMACY VISIT (OUTPATIENT)
Dept: PHARMACY | Facility: CLINIC | Age: 60
End: 2025-01-03
Payer: COMMERCIAL

## 2025-01-14 DIAGNOSIS — K21.9 GASTROESOPHAGEAL REFLUX DISEASE WITHOUT ESOPHAGITIS: ICD-10-CM

## 2025-01-14 RX ORDER — ESOMEPRAZOLE MAGNESIUM 40 MG/1
40 CAPSULE, DELAYED RELEASE ORAL 2 TIMES DAILY
Qty: 180 CAPSULE | Refills: 3 | Status: SHIPPED | OUTPATIENT
Start: 2025-01-14

## 2025-01-20 DIAGNOSIS — K21.9 GASTROESOPHAGEAL REFLUX DISEASE WITHOUT ESOPHAGITIS: ICD-10-CM

## 2025-01-21 PROCEDURE — RXMED WILLOW AMBULATORY MEDICATION CHARGE

## 2025-01-21 RX ORDER — ESOMEPRAZOLE MAGNESIUM 40 MG/1
40 CAPSULE, DELAYED RELEASE ORAL 2 TIMES DAILY
Qty: 180 CAPSULE | Refills: 3 | Status: SHIPPED | OUTPATIENT
Start: 2025-01-21

## 2025-01-23 ENCOUNTER — PHARMACY VISIT (OUTPATIENT)
Dept: PHARMACY | Facility: CLINIC | Age: 60
End: 2025-01-23
Payer: COMMERCIAL

## 2025-01-25 ASSESSMENT — PROMIS GLOBAL HEALTH SCALE
RATE_PHYSICAL_HEALTH: VERY GOOD
CARRYOUT_PHYSICAL_ACTIVITIES: COMPLETELY
CARRYOUT_SOCIAL_ACTIVITIES: VERY GOOD
RATE_QUALITY_OF_LIFE: EXCELLENT
RATE_GENERAL_HEALTH: VERY GOOD
EMOTIONAL_PROBLEMS: RARELY
RATE_AVERAGE_PAIN: 0
RATE_MENTAL_HEALTH: VERY GOOD
RATE_SOCIAL_SATISFACTION: VERY GOOD

## 2025-01-27 ENCOUNTER — APPOINTMENT (OUTPATIENT)
Dept: PRIMARY CARE | Facility: CLINIC | Age: 60
End: 2025-01-27
Payer: COMMERCIAL

## 2025-01-27 VITALS
HEIGHT: 64 IN | DIASTOLIC BLOOD PRESSURE: 82 MMHG | HEART RATE: 88 BPM | WEIGHT: 138 LBS | BODY MASS INDEX: 23.56 KG/M2 | TEMPERATURE: 97.4 F | OXYGEN SATURATION: 99 % | SYSTOLIC BLOOD PRESSURE: 118 MMHG

## 2025-01-27 DIAGNOSIS — E03.9 HYPOTHYROIDISM (ACQUIRED): ICD-10-CM

## 2025-01-27 DIAGNOSIS — K21.00 GASTROESOPHAGEAL REFLUX DISEASE WITH ESOPHAGITIS WITHOUT HEMORRHAGE: ICD-10-CM

## 2025-01-27 DIAGNOSIS — Z00.00 HEALTH CARE MAINTENANCE: ICD-10-CM

## 2025-01-27 DIAGNOSIS — E78.2 HYPERLIPIDEMIA, MIXED: ICD-10-CM

## 2025-01-27 DIAGNOSIS — Z12.31 ENCOUNTER FOR SCREENING MAMMOGRAM FOR MALIGNANT NEOPLASM OF BREAST: ICD-10-CM

## 2025-01-27 DIAGNOSIS — I10 BENIGN ESSENTIAL HYPERTENSION: ICD-10-CM

## 2025-01-27 DIAGNOSIS — Z00.01 ANNUAL VISIT FOR GENERAL ADULT MEDICAL EXAMINATION WITH ABNORMAL FINDINGS: Primary | ICD-10-CM

## 2025-01-27 PROBLEM — R10.10 PAIN OF UPPER ABDOMEN: Status: RESOLVED | Noted: 2024-05-02 | Resolved: 2025-01-27

## 2025-01-27 PROCEDURE — 3079F DIAST BP 80-89 MM HG: CPT | Performed by: INTERNAL MEDICINE

## 2025-01-27 PROCEDURE — 99213 OFFICE O/P EST LOW 20 MIN: CPT | Performed by: INTERNAL MEDICINE

## 2025-01-27 PROCEDURE — 3008F BODY MASS INDEX DOCD: CPT | Performed by: INTERNAL MEDICINE

## 2025-01-27 PROCEDURE — 99396 PREV VISIT EST AGE 40-64: CPT | Performed by: INTERNAL MEDICINE

## 2025-01-27 PROCEDURE — 1036F TOBACCO NON-USER: CPT | Performed by: INTERNAL MEDICINE

## 2025-01-27 PROCEDURE — 3074F SYST BP LT 130 MM HG: CPT | Performed by: INTERNAL MEDICINE

## 2025-01-27 RX ORDER — AMOXICILLIN 500 MG/1
500 CAPSULE ORAL EVERY 8 HOURS SCHEDULED
Qty: 21 CAPSULE | Refills: 0 | Status: SHIPPED | OUTPATIENT
Start: 2025-01-27 | End: 2025-02-03

## 2025-01-27 RX ORDER — SCOPOLAMINE 1 MG/3D
1 PATCH, EXTENDED RELEASE TRANSDERMAL
Qty: 10 PATCH | Refills: 0 | Status: SHIPPED | OUTPATIENT
Start: 2025-01-27 | End: 2025-03-28

## 2025-01-27 RX ORDER — CIPROFLOXACIN 500 MG/1
500 TABLET ORAL 2 TIMES DAILY
Qty: 14 TABLET | Refills: 0 | Status: SHIPPED | OUTPATIENT
Start: 2025-01-27 | End: 2025-02-03

## 2025-01-27 ASSESSMENT — PATIENT HEALTH QUESTIONNAIRE - PHQ9
SUM OF ALL RESPONSES TO PHQ9 QUESTIONS 1 AND 2: 0
1. LITTLE INTEREST OR PLEASURE IN DOING THINGS: NOT AT ALL
2. FEELING DOWN, DEPRESSED OR HOPELESS: NOT AT ALL

## 2025-01-27 NOTE — PROGRESS NOTES
Assessment and Plan:  Problem List Items Addressed This Visit       Hyperlipidemia, mixed     Continue Lipitor 40 mg a day monitor CMP CPK lipid twice a year         Relevant Orders    Albumin-Creatinine Ratio, Urine Random    CBC and Auto Differential    Comprehensive Metabolic Panel    Hemoglobin A1C    Lipid Panel    Magnesium    Uric Acid    TSH with reflex to Free T4 if abnormal    Urine Culture    Benign essential hypertension     Continue Lotrel 10/41 a day BMP twice a year         Relevant Orders    Albumin-Creatinine Ratio, Urine Random    CBC and Auto Differential    Comprehensive Metabolic Panel    Hemoglobin A1C    Lipid Panel    Magnesium    Uric Acid    TSH with reflex to Free T4 if abnormal    Urine Culture    Hypothyroidism (acquired)     Continue Synthroid 25 mcg a day check TSH twice a year keep TSH less than 4         Relevant Orders    Albumin-Creatinine Ratio, Urine Random    CBC and Auto Differential    Comprehensive Metabolic Panel    Hemoglobin A1C    Lipid Panel    Magnesium    Uric Acid    TSH with reflex to Free T4 if abnormal    Urine Culture    GERD (gastroesophageal reflux disease)     Continue Nexium 40 mg a day monitor H. pylori CBC and magnesium once a year advised to follow-up with GI for EGD colonoscopy as per GI recommendation         Relevant Orders    Albumin-Creatinine Ratio, Urine Random    CBC and Auto Differential    Comprehensive Metabolic Panel    Hemoglobin A1C    Lipid Panel    Magnesium    Uric Acid    TSH with reflex to Free T4 if abnormal    Urine Culture    Health care maintenance - Primary    Relevant Medications    ciprofloxacin (Cipro) 500 mg tablet    amoxicillin (Amoxil) 500 mg capsule    scopolamine (Transderm-Scop) 1 mg over 3 days patch 3 day         Chief Complaint:   Annual  Wellness Office Exam/Comprehensive Problem Focused Follow Up and Physical Exam    Patient was evaluated today, problem list was reviewed, problems and concerns addressed, Rx list  reviewed and updated, lab and tests were noted and reviewed. Life style changes were discussed, always it works better if we eat plant based diet and plenty of fibres and roughage. Consume adequate amount of water and avoid alcohol, light to moderate physical activities and stress reduction are always beneficial for ongoing physical well being. Do not forget to have 6 to 7 hours of sleep regularly and avoid late night imelda screen exposure.    HPI:  Patient is a 58 y/o female presenting to the clinic for Annual Wellness Office Exam/Comprehensive Problem Focused Follow Up and Physical Exam. Patient is traveling in the summer and fall and would like antibiotics to keep with her as she is concerned for UTIs or some other infection. Denies family hx of diabetes. Admits hx of family history of Non-Hodgkins Lymphoma (father).    Admits to doing yoga.  Negative for fall    Negative for suicide    Negative for hematuria rectal bleeding or jaundice    Patient involved in the voluntary basis to participate in weight loss program she thinks he might have taken semaglutide shot but not taking any medication anymore review laboratory done by part of the study with patient and discussed    She is traveling requesting given amoxicillin 503 times a day Cipro 500 twice a day advised to use with the probiotics also called the Transderm scopolamine patch #10 for possible motion sickness while traveling  Patient Active Problem List:  Patient Active Problem List   Diagnosis    Hyperlipidemia, mixed    Benign essential hypertension    Hypothyroidism (acquired)    GERD (gastroesophageal reflux disease)    Health care maintenance    Screening for hepatitis C declined          Comprehensive Medical/Surgical/Social/Family History:  Family History   Problem Relation Name Age of Onset    Dementia Mother      Lymphoma Father         Past Medical History:   Diagnosis Date    Encounter for screening for malignant neoplasm of colon 08/06/2020     Screen for colon cancer    Encounter for screening for malignant neoplasm of rectum 2020    Screening for rectal cancer    Encounter for screening mammogram for malignant neoplasm of breast 2020    Other screening mammogram    Obesity, unspecified 2022    Obesity (BMI 30.0-34.9)    Personal history of other diseases of the respiratory system 2018    History of influenza    Personal history of other endocrine, nutritional and metabolic disease 2018    History of hypokalemia    Personal history of other endocrine, nutritional and metabolic disease 2020    History of vitamin D deficiency    Personal history of other specified conditions     History of fever    Personal history of urinary (tract) infections 2018    History of urinary tract infection       Past Surgical History:   Procedure Laterality Date    BREAST BIOPSY       SECTION, LOW TRANSVERSE      GALLBLADDER SURGERY         Social History     Socioeconomic History    Marital status:    Tobacco Use    Smoking status: Never    Smokeless tobacco: Never   Substance and Sexual Activity    Alcohol use: Never    Drug use: Never    Sexual activity: Yes     Partners: Male     Birth control/protection: Female Sterilization       Tobacco/Alcohol/Opioid use, as well as Illicit Drug Use was screened for/reviewed and documented in Social Documentation section of the chart and medication list as appropriate    Allergies and Medications  Allergies   Allergen Reactions    Apricot Flavor Hives     Current Outpatient Medications   Medication Sig Dispense Refill    amLODIPine-benazepriL (Lotrel) 10-40 mg capsule TAKE 1 CAPSULE BY MOUTH ONCE DAILY 90 capsule 3    atorvastatin (Lipitor) 40 mg tablet TAKE 1 TABLET BY MOUTH AT BEDTIME 90 tablet 3    esomeprazole (NexIUM) 40 mg DR capsule Take 1 capsule (40 mg) by mouth 2 times a day. 180 capsule 3    levothyroxine (Synthroid, Levoxyl) 25 mcg tablet TAKE 1 TABLET BY MOUTH EVERY  MORNING BEFORE MEALS 90 tablet 3    potassium chloride CR (Klor-Con M20) 20 mEq ER tablet Take 1 tablet (20 mEq) by mouth once daily. Do not crush or chew. (Patient taking differently: Take 0.5 tablets (10 mEq) by mouth once daily. Do not crush or chew.) 90 tablet 3    triamterene-hydrochlorothiazid (Maxzide-25) 37.5-25 mg tablet Take 1 tablet by mouth once daily. 90 tablet 3    amoxicillin (Amoxil) 500 mg capsule Take 1 capsule (500 mg) by mouth every 8 hours for 7 days. 21 capsule 0    ciprofloxacin (Cipro) 500 mg tablet Take 1 tablet (500 mg) by mouth 2 times a day for 7 days. 14 tablet 0    scopolamine (Transderm-Scop) 1 mg over 3 days patch 3 day Place 1 patch over 72 hours on the skin every 3rd day if needed (nausea). 10 patch 0     No current facility-administered medications for this visit.       Medications and Supplements  prescribed by me and other practitioners or clinical pharmacist (such as prescriptions, OTC's, herbal therapies and supplements) were reviewed and documented in the medical record.     Advance directives  Advanced Care Planning (including a Living Will, Healthcare POA, as well as specific end of life choices and/or directives), was discussed for approximately 16 minutes with the patient and/or surrogate, voluntarily, and documented in the Problem List of the medical record.     Cardiac Risk Assessment  Cardiovascular risk was discussed and, if needed, lifestyle modifications recommended, including nutritional choices, exercise, and elimination of habits contributing to risk. We agreed on a plan to reduce the current cardiovascular risk based on above discussion as needed.  Aspirin use/disuse was discussed and documented in the Problem List of the medical record after reviewing the updated guidelines below:    Consider low dose Aspirin ( mg) use if the benefit for cardiovascular disease prevention outweighs risk for bleeding complications.   In general, low dose ASA should be  "considered:  In patients WITHOUT prior MI/stroke/PAD (primary prevention):   a. Age <60: Use if 10-year cardiovascular disease risk >20%, with discussion of risks and benefits with patient  b. Age 60-<70: Use if 10-year cardiovascular disease risk >20% and low bleeding (e.g., gastrointenstinal) risk, with discussion of risks and benefits with patient  c. Age >=70: Do not use    In patients WITH prior MI/stroke/PAD (secondary prevention):   Generally use unless extremely high bleeding (e.g., gastrointenstinal) risk, with discussion of risks and benefits with patient    ROS otherwise negative aside from what was mentioned above in HPI.    Visit Vitals  /82   Pulse 88   Temp 36.3 °C (97.4 °F)   Ht 1.626 m (5' 4\")   Wt 62.6 kg (138 lb)   SpO2 99%   BMI 23.69 kg/m²   Smoking Status Never   BSA 1.68 m²       Physical Exam  Physical Exam:    Appearance: Alert, oriented , cooperative,  in no acute distress. Well nourished & well hydrated.    Skin: Intact,  dry skin, no lesions, rash, petechiae or purpura.     Eyes: PERRLA, EOMs intact,  Conjunctiva pink with no redness or exudates. Cornea & anterior chamber are clear, Eyelids without lesions. No scleral icterus.     ENT: Hearing grossly intact. External auditory canals patent, tympanic membranes intact with visible landmarks. Nares patent, mucus membranes moist. Dentition without lesions. Pharynx clear, uvula midline.     Neck: Supple, without meningismus. Thyroid not palpable. Trachea at midline. No lymphadenopathy.    Pulmonary: Clear bilaterally with good chest wall excursion. No rales, rhonchi or wheezing. No accessory muscle use or stridor.    Cardiac: Normal S1, S2 without murmur, rub, gallop or extrasystole. No JVD, Carotids without bruits.    Abdomen: Soft, nontender, active bowel sounds.  No palpable organomegaly.  No rebound or guarding.  No CVA tenderness.    Genitourinary: Exam deferred.    Musculoskeletal: Full range of motion. no pain, edema, or " deformity. Pulses full and equal. No cyanosis, clubbing, or edema.    Neurological:  Cranial nerves II through XII are grossly intact, finger-nose touch is normal, normal sensation, no weakness, no focal findings identified.    Psychiatric: Appropriate mood and affect.         During the course of the visit the patient was educated and counseled about age appropriate screening and preventive services. Completed preventive screenings were documented in the chart and orders were placed for outstanding screenings/procedures as documented in the Assessment and Plan.    Patient Instructions (the written plan) was given to the patient at check out.    Charting was completed using voice recognition technology and may include unintended errors.

## 2025-01-27 NOTE — ASSESSMENT & PLAN NOTE
Continue Nexium 40 mg a day monitor H. pylori CBC and magnesium once a year advised to follow-up with GI for EGD colonoscopy as per GI recommendation

## 2025-02-10 PROCEDURE — RXMED WILLOW AMBULATORY MEDICATION CHARGE

## 2025-02-13 ENCOUNTER — PHARMACY VISIT (OUTPATIENT)
Dept: PHARMACY | Facility: CLINIC | Age: 60
End: 2025-02-13
Payer: COMMERCIAL

## 2025-03-09 PROCEDURE — RXMED WILLOW AMBULATORY MEDICATION CHARGE

## 2025-03-10 DIAGNOSIS — Z00.00 HEALTH CARE MAINTENANCE: ICD-10-CM

## 2025-03-10 PROCEDURE — RXMED WILLOW AMBULATORY MEDICATION CHARGE

## 2025-03-10 RX ORDER — AMOXICILLIN 500 MG/1
500 CAPSULE ORAL EVERY 8 HOURS SCHEDULED
Qty: 30 CAPSULE | Refills: 0 | Status: SHIPPED | OUTPATIENT
Start: 2025-03-10 | End: 2025-03-23

## 2025-03-13 ENCOUNTER — PHARMACY VISIT (OUTPATIENT)
Dept: PHARMACY | Facility: CLINIC | Age: 60
End: 2025-03-13
Payer: COMMERCIAL

## 2025-03-14 PROCEDURE — RXMED WILLOW AMBULATORY MEDICATION CHARGE

## 2025-03-19 ENCOUNTER — PHARMACY VISIT (OUTPATIENT)
Dept: PHARMACY | Facility: CLINIC | Age: 60
End: 2025-03-19
Payer: COMMERCIAL

## 2025-04-13 PROCEDURE — RXMED WILLOW AMBULATORY MEDICATION CHARGE

## 2025-04-16 ENCOUNTER — PHARMACY VISIT (OUTPATIENT)
Dept: PHARMACY | Facility: CLINIC | Age: 60
End: 2025-04-16
Payer: COMMERCIAL

## 2025-05-08 PROCEDURE — RXMED WILLOW AMBULATORY MEDICATION CHARGE

## 2025-05-12 ENCOUNTER — PHARMACY VISIT (OUTPATIENT)
Dept: PHARMACY | Facility: CLINIC | Age: 60
End: 2025-05-12
Payer: COMMERCIAL

## 2025-06-12 DIAGNOSIS — Z00.00 HEALTH CARE MAINTENANCE: ICD-10-CM

## 2025-06-12 PROCEDURE — RXMED WILLOW AMBULATORY MEDICATION CHARGE

## 2025-06-12 RX ORDER — POTASSIUM CHLORIDE 750 MG/1
10 TABLET, FILM COATED, EXTENDED RELEASE ORAL DAILY
Qty: 90 TABLET | Refills: 3 | Status: SHIPPED | OUTPATIENT
Start: 2025-06-12 | End: 2026-06-12

## 2025-06-13 ENCOUNTER — PHARMACY VISIT (OUTPATIENT)
Dept: PHARMACY | Facility: CLINIC | Age: 60
End: 2025-06-13
Payer: COMMERCIAL

## 2025-06-19 LAB
ALBUMIN SERPL-MCNC: 4.3 G/DL (ref 3.6–5.1)
ALBUMIN/CREAT UR: 18 MG/G CREAT
ALP SERPL-CCNC: 61 U/L (ref 37–153)
ALT SERPL-CCNC: 18 U/L (ref 6–29)
ANION GAP SERPL CALCULATED.4IONS-SCNC: 9 MMOL/L (CALC) (ref 7–17)
AST SERPL-CCNC: 18 U/L (ref 10–35)
BACTERIA UR CULT: NORMAL
BASOPHILS # BLD AUTO: 32 CELLS/UL (ref 0–200)
BASOPHILS NFR BLD AUTO: 0.6 %
BILIRUB SERPL-MCNC: 0.6 MG/DL (ref 0.2–1.2)
BUN SERPL-MCNC: 8 MG/DL (ref 7–25)
CALCIUM SERPL-MCNC: 9.3 MG/DL (ref 8.6–10.4)
CHLORIDE SERPL-SCNC: 106 MMOL/L (ref 98–110)
CHOLEST SERPL-MCNC: 166 MG/DL
CHOLEST/HDLC SERPL: 2.1 (CALC)
CO2 SERPL-SCNC: 28 MMOL/L (ref 20–32)
CREAT SERPL-MCNC: 0.81 MG/DL (ref 0.5–1.05)
CREAT UR-MCNC: 191 MG/DL (ref 20–275)
EGFRCR SERPLBLD CKD-EPI 2021: 83 ML/MIN/1.73M2
EOSINOPHIL # BLD AUTO: 162 CELLS/UL (ref 15–500)
EOSINOPHIL NFR BLD AUTO: 3 %
ERYTHROCYTE [DISTWIDTH] IN BLOOD BY AUTOMATED COUNT: 12.3 % (ref 11–15)
EST. AVERAGE GLUCOSE BLD GHB EST-MCNC: 108 MG/DL
EST. AVERAGE GLUCOSE BLD GHB EST-SCNC: 6 MMOL/L
GLUCOSE SERPL-MCNC: 75 MG/DL (ref 65–99)
HBA1C MFR BLD: 5.4 %
HCT VFR BLD AUTO: 40.1 % (ref 35–45)
HDLC SERPL-MCNC: 78 MG/DL
HGB BLD-MCNC: 13.2 G/DL (ref 11.7–15.5)
LDLC SERPL CALC-MCNC: 73 MG/DL (CALC)
LYMPHOCYTES # BLD AUTO: 1534 CELLS/UL (ref 850–3900)
LYMPHOCYTES NFR BLD AUTO: 28.4 %
MAGNESIUM SERPL-MCNC: 2.1 MG/DL (ref 1.5–2.5)
MCH RBC QN AUTO: 30.3 PG (ref 27–33)
MCHC RBC AUTO-ENTMCNC: 32.9 G/DL (ref 32–36)
MCV RBC AUTO: 92 FL (ref 80–100)
MICROALBUMIN UR-MCNC: 3.5 MG/DL
MONOCYTES # BLD AUTO: 502 CELLS/UL (ref 200–950)
MONOCYTES NFR BLD AUTO: 9.3 %
NEUTROPHILS # BLD AUTO: 3170 CELLS/UL (ref 1500–7800)
NEUTROPHILS NFR BLD AUTO: 58.7 %
NONHDLC SERPL-MCNC: 88 MG/DL (CALC)
PLATELET # BLD AUTO: 328 THOUSAND/UL (ref 140–400)
PMV BLD REES-ECKER: 9.9 FL (ref 7.5–12.5)
POTASSIUM SERPL-SCNC: 4 MMOL/L (ref 3.5–5.3)
PROT SERPL-MCNC: 6.4 G/DL (ref 6.1–8.1)
RBC # BLD AUTO: 4.36 MILLION/UL (ref 3.8–5.1)
SODIUM SERPL-SCNC: 143 MMOL/L (ref 135–146)
TRIGL SERPL-MCNC: 70 MG/DL
TSH SERPL-ACNC: 2.79 MIU/L (ref 0.4–4.5)
URATE SERPL-MCNC: 4.2 MG/DL (ref 2.5–7)
WBC # BLD AUTO: 5.4 THOUSAND/UL (ref 3.8–10.8)

## 2025-07-16 DIAGNOSIS — E03.9 HYPOTHYROIDISM (ACQUIRED): ICD-10-CM

## 2025-07-16 PROCEDURE — RXMED WILLOW AMBULATORY MEDICATION CHARGE

## 2025-07-17 ENCOUNTER — PHARMACY VISIT (OUTPATIENT)
Dept: PHARMACY | Facility: CLINIC | Age: 60
End: 2025-07-17
Payer: COMMERCIAL

## 2025-07-17 DIAGNOSIS — E03.9 ACQUIRED HYPOTHYROIDISM: Primary | ICD-10-CM

## 2025-07-17 DIAGNOSIS — Z11.59 NEED FOR HEPATITIS C SCREENING TEST: ICD-10-CM

## 2025-07-17 DIAGNOSIS — Z11.4 SCREENING FOR HUMAN IMMUNODEFICIENCY VIRUS: ICD-10-CM

## 2025-07-17 RX ORDER — LEVOTHYROXINE SODIUM 25 UG/1
25 TABLET ORAL
Qty: 90 TABLET | Refills: 3 | Status: SHIPPED | OUTPATIENT
Start: 2025-07-17 | End: 2026-07-17